# Patient Record
(demographics unavailable — no encounter records)

---

## 2025-05-20 NOTE — ASSESSMENT
[FreeTextEntry1] : 64M PMH class I obesity, prediabetes, HTN, HLD, low HDL, CAD (acute MI 10/5/18, PCI/stent x2 RCA). pAFib, lumbar DDD, EoE, s/p lap miguelina who presents to weight management for initial evaluation.   # Metabolic syndrome (class I obesity - central, prediabetes, HTN, low HDL) c/b HLD, CAD w/hx acute MI (10/2018) and RCA stent x2: Weight today 244 lbs, BMI 32.2, BF 28.5%, notes he was thin as a child, started to gain late college while working at a [employer], and became increasingly sedentary over the years leading to associated weight gain. In 2018, at ~225 lbs, he had an MI while jogging, underwent angioplasty and stent placed to RCA, followed by subsequent MI post-procedure and additional RCA stent placement. Took his health more seriously post-procedure and lost to 205 lbs with healthier mediterranean diet and very regular exercise, which was derailed by combination of job stress and COVID gym closings, leading to regain to current weight. Has begun exercise again, watching diet, struggles with sweets, lunch often "whatever is around", grazes/snacks on sweets in the house, sometimes eats at night if he goes to bed too late, occasional liquid kcal and take-out / restaurants. Does snore, sleep disrupted. - Food log - Meal planning/prep - Try to stick to meals, limit snacks. Ideally get snacks/sweets outside of the house, or at least less accessible and in vision.  - Improved choices for take-out (ie chicken/broccoli sauce on side instead of [employer], etc).  - Restarting workouts with .  - Discussed medical treatments, Wegovy prescribed for CAD w/hx prior MI.  - F/u in 1 mo

## 2025-05-20 NOTE — HISTORY OF PRESENT ILLNESS
[FreeTextEntry1] : 64M PMH class I obesity, prediabetes, HTN, HLD, low HDL, CAD (acute MI 10/5/18, PCI/stent x2 RCA). pAFib, lumbar DDD, EoE, s/p lap miguelina who presents to weight management for initial evaluation.   Weight/Diet History:  Always thin as a child. Started working at [employer] at the end of college, also became more sedentary over the years. In 2018 he was ~225 lbs and had a heart attack while jogging, had stent placed, another MI and another stent.  At that time became more serious about what he was going to eat, got to ~205 lbs, went to [employer] gym after work every day until COVID hit, gym was closed and stress of his job (at [employer]) led to weight gain.  Was going to gym daily, not losing. Started with a , lost 5 lbs. Has tried mediterranean diet, tries to avoid sweets but struggles.   Weight today: 244 lbs, BMI 32.2, BF 28.5%   Diet: B (7:30 am): Protein shake, banana, 2 tbsp peanut butter powder. Or 4 egg whites, 4 slices whole wheat toast with olive oil spread. Or cheerios, almonds, sliced banana, walnuts, kashi. L (): "whatever is in the fridge", Macaroni meatballs, sandwich (ie tuna). D (5:30-9 pm): chicken (chicken burger, chicken thigh) / fish / salmon, frozen vegetables (mixed, or brussels) Dessert:  Snack: grazing on sweets - chocolate pudding, cookies, peanut butter cups Night-time eating: tries not to, if goes to bed after 9:30, may have PBJ sandwich Beverages: Occasional alcohol, coffee w/sugar and almond milk, occasionally gatorade with workout Binging: Fast-food/Restaurants: restaurants on weekends (Chinese, Italian), pizza 2-4x/mo Cook/Prepare meals: Added oils: Food Journal: after heart attack   Exercise: Restarting 3x/wk gym.    Sleep: "not good", awakenings, does snore. Estimates that he sleeps 11 pm to 7 am.    Social Hx: Former smoker. Social alcohol use, 2-3 glasses of wine per month. . Works for [employer] in  [place].   No FHx thyroid cancer.    Labs ([facility], 4/21/25): CBC, CMP (gluc 113, ALT 52), A1c 6.2%, Trig 109, HDL 39, LDL 70, TChol 129, TSH

## 2025-05-20 NOTE — PHYSICAL EXAM
[Obese, well nourished, in no acute distress] : obese, well nourished, in no acute distress [Normal] : affect appropriate [de-identified] : T

## 2025-05-20 NOTE — ASSESSMENT
[FreeTextEntry1] : 64M PMH class I obesity, prediabetes, HTN, HLD, low HDL, CAD (acute MI 10/5/18, PCI/stent x2 RCA). pAFib, lumbar DDD, EoE, s/p lap miguelina who presents to weight management for initial evaluation.   # Metabolic syndrome (class I obesity - central, prediabetes, HTN, low HDL) c/b HLD, CAD w/hx acute MI (10/2018) and RCA stent x2: Weight today 244 lbs, BMI 32.2, BF 28.5%, notes he was thin as a child, started to gain late college while working at a [employer], and became increasingly sedentary over the years leading to associated weight gain. In 2018, at ~225 lbs, he had an MI while jogging, underwent angioplasty and stent placed to RCA, followed by subsequent MI post-procedure and additional RCA stent placement. Took his health more seriously post-procedure and lost to 205 lbs with healthier mediterranean diet and very regular exercise, which was derailed by combination of job stress and COVID gym closings, leading to regain to current weight. Has begun exercise again, watching diet, struggles with sweets, lunch often "whatever is around", grazes/snacks on sweets in the house, sometimes eats at night if he goes to bed too late, occasional liquid kcal and take-out / restaurants. Does snore, sleep disrupted. - Food log - Meal planning/prep - Try to stick to meals, limit snacks. Ideally get snacks/sweets outside of the house, or at least less accessible and in vision.  - Improved choices for take-out (ie chicken/broccoli sauce on side instead of [employer], etc).  - Restarting workouts with .  - Discussed medical treatments, Wegovy prescribed for CAD w/hx prior MI.  - F/u in 1 mo

## 2025-05-20 NOTE — HISTORY OF PRESENT ILLNESS
[FreeTextEntry1] : 64M PMH class I obesity, prediabetes, HTN, HLD, low HDL, CAD (acute MI 10/5/18, PCI/stent x2 RCA). pAFib, lumbar DDD, EoE, s/p lap miguelina who presents to weight management for initial evaluation.   Weight/Diet History:  Always thin as a child. Started working at [employer] at the end of college, also became more sedentary over the years. In 2018 he was ~225 lbs and had a heart attack while jogging, had stent placed, another MI and another stent.  At that time became more serious about what he was going to eat, got to ~205 lbs, went to [employer] gym after work every day until COVID hit, gym was closed and stress of his job (at [employer]) led to weight gain.  Was going to gym daily, not losing. Started with a , lost 5 lbs. Has tried mediterranean diet, tries to avoid sweets but struggles.   Weight today: 244 lbs, BMI 32.2, BF 28.5%   Diet: B (7:30 am): Protein shake, banana, 2 tbsp peanut butter powder. Or 4 egg whites, 4 slices whole wheat toast with olive oil spread. Or cheerios, almonds, sliced banana, walnuts, kashi. L (): "whatever is in the fridge", Macaroni meatballs, sandwich (ie tuna). D (5:30-9 pm): chicken (chicken burger, chicken thigh) / fish / salmon, frozen vegetables (mixed, or brussels) Dessert:  Snack: grazing on sweets - chocolate pudding, cookies, peanut butter cups Night-time eating: tries not to, if goes to bed after 9:30, may have PBJ sandwich Beverages: Occasional alcohol, coffee w/sugar and almond milk, occasionally gatorade with workout Binging: Fast-food/Restaurants: restaurants on weekends (Chinese, Italian), pizza 2-4x/mo Cook/Prepare meals: Added oils: Food Journal: after heart attack   Exercise: Restarting 3x/wk gym.    Sleep: "not good", awakenings, does snore. Estimates that he sleeps 11 pm to 7 am.    Social Hx: Former smoker. Social alcohol use, 2-3 glasses of wine per month. . Works for [employer] in  [place].   No FHx thyroid cancer.    Labs ([facility], 4/21/25): CBC, CMP (gluc 113, ALT 52), A1c 6.2%, Trig 109, HDL 39, LDL 70, TChol 129, TSH

## 2025-05-20 NOTE — PHYSICAL EXAM
[Obese, well nourished, in no acute distress] : obese, well nourished, in no acute distress [Normal] : affect appropriate [de-identified] : T

## 2025-07-17 NOTE — HISTORY OF PRESENT ILLNESS
[FreeTextEntry1] : 64M PMH class I obesity, prediabetes, HTN, HLD, low HDL, CAD (acute MI 10/5/18, PCI/stent x2 RCA). pAFib, lumbar DDD, EoE, s/p lap miguelina who presents to weight management for follow-up.   He is an  for [employer] at [employer] who initially presented 5/2025 reporting that he was thin as a child, started to gain late college while working at a [employer], and became increasingly sedentary over the years leading to associated weight gain. In 2018, at ~225 lbs, he had an MI while jogging, underwent angioplasty and stent placed to RCA, followed by subsequent MI post-procedure and additional RCA stent placement. Took his health more seriously post-procedure and lost to 205 lbs with healthier mediterranean diet and very regular exercise, which was derailed by combination of job stress and COVID gym closings, leading to regain to current weight. More recently began exercise again, watching diet, noted struggle with sweets, lunch often "whatever was around", graze/snack on sweets in the house, sometimes eat at night if going to bed too late, occasional liquid kcal and take-out / restaurants. Snoring noted, sleep disrupted.  We discussed lifestyle and dietary changes. We discussed medical treatment, Wegovy prescribed for cardio-protection.   Weight today: 236.8 lbs, BMI 31.24, BF 39.9% Weight at last visit (6/16/25): 246 lbs, BMI 32.5, BF 36.6% Weight at Initial Visit (5/19/25): 244 lbs, BMI 32.2, BF 28.5%   Medication: He has taken Wegovy 0.25 mg/wk x4 doses, had some nausea/light headedness at first, and some headaches. Nausea much less and brief  Diet: Less hungry. Trying to focus on carbohydrate, less 'junk'. Sometimes has to remind himself to eat. Has protein shake in the morning with powdered peanut butter, banana, ice. Weekeneds - avocado toast with 2 poached eggs. Not much snacking, maybe a piece of chocolate.    Exercise: At first struggled going to gym with nausea but has gotten better, but he aggravated his back again.    Sleep:  ("not good", awakenings, does snore. Estimates that he sleeps 11 pm to 7 am.   Labs ([facility], 4/21/25): CBC, CMP (gluc 113, ALT 52), A1c 6.2%, Trig 109, HDL 39, LDL 70, TChol 129, TSH

## 2025-07-17 NOTE — ASSESSMENT
[FreeTextEntry1] : 64M PMH class I obesity, prediabetes, HTN, HLD, low HDL, CAD (acute MI 10/5/18, PCI/stent x2 RCA). pAFib, lumbar DDD, EoE, s/p lap miguelina who presents to weight management for follow-up.   # Metabolic syndrome (class I obesity - central, prediabetes, HTN, low HDL) c/b HLD, CAD w/hx acute MI (10/2018) and RCA stent x2: Weight today 236.8 lbs, BMI 31.24, BF 39.9%, down 9 lbs since last visit 1 month ago and has taken 4 doses of 0.25 mg/wk Wegovy and has responded well, some adverse effects that are getting better. Diet is better. Exercise limited by back pain currently, was initially by nausea but that improved. - Food log - Meal planning/prep - Dietician referral - Try to stick to meals, limit snacks. Ideally get snacks/sweets outside of the house, or at least less accessible and in vision.  - Improved choices for take-out (ie chicken/broccoli sauce on side instead of [employer], etc).  - Restarting workouts with .  - Repeat Wegovy .25 mg/wk for CAD w/hx prior MI. Will c/w this dose as long as it is working and ideally adverse effects have gone away.  - F/u in ~7 weeks
Awake/Alert